# Patient Record
Sex: FEMALE | Race: ASIAN | Employment: UNEMPLOYED | URBAN - METROPOLITAN AREA
[De-identification: names, ages, dates, MRNs, and addresses within clinical notes are randomized per-mention and may not be internally consistent; named-entity substitution may affect disease eponyms.]

---

## 2020-08-17 ENCOUNTER — OFFICE VISIT (OUTPATIENT)
Dept: OPTOMETRY | Facility: CLINIC | Age: 55
End: 2020-08-17
Payer: COMMERCIAL

## 2020-08-17 DIAGNOSIS — H52.13 MYOPIA OF BOTH EYES: ICD-10-CM

## 2020-08-17 DIAGNOSIS — Z46.0 CONTACT LENS/GLASSES FITTING: ICD-10-CM

## 2020-08-17 DIAGNOSIS — H52.223 REGULAR ASTIGMATISM OF BOTH EYES: ICD-10-CM

## 2020-08-17 DIAGNOSIS — Z01.00 ENCOUNTER FOR EXAMINATION OF EYES AND VISION WITHOUT ABNORMAL FINDINGS: Primary | ICD-10-CM

## 2020-08-17 PROCEDURE — 92310 CONTACT LENS FITTING OU: CPT | Mod: GA | Performed by: OPTOMETRIST

## 2020-08-17 PROCEDURE — 92015 DETERMINE REFRACTIVE STATE: CPT | Performed by: OPTOMETRIST

## 2020-08-17 PROCEDURE — 92004 COMPRE OPH EXAM NEW PT 1/>: CPT | Performed by: OPTOMETRIST

## 2020-08-17 ASSESSMENT — VISUAL ACUITY
CORRECTION_TYPE: GLASSES
METHOD: SNELLEN - LINEAR
OS_CC: 20/40-1
OS_CC+: -2
OD_CC: 20/25
OD_CC: 20/50+2
OS_SC: 20/300
OD_CC+: -1
OD_SC: 20/500
OS_CC: 20/30

## 2020-08-17 ASSESSMENT — REFRACTION_WEARINGRX
OD_AXIS: 110
OS_SPHERE: -11.00
OD_SPHERE: -10.25
SPECS_TYPE: SVL
OS_AXIS: 065
OS_CYLINDER: +1.25
OD_CYLINDER: +1.25

## 2020-08-17 ASSESSMENT — TONOMETRY
OS_IOP_MMHG: 18
OD_IOP_MMHG: 18
IOP_METHOD: APPLANATION

## 2020-08-17 ASSESSMENT — REFRACTION_MANIFEST
OS_SPHERE: -10.75
OD_AXIS: 115
OD_CYLINDER: +1.25
OS_CYLINDER: +1.00
OD_SPHERE: -10.25
OS_AXIS: 070

## 2020-08-17 ASSESSMENT — CUP TO DISC RATIO
OD_RATIO: 0.3
OS_RATIO: 0.25

## 2020-08-17 ASSESSMENT — REFRACTION_CURRENTRX
OD_BASECURVE: 8.60
OS_DIAMETER: 14.5
OD_DIAMETER: 14.5
OS_SPHERE: -9.00
OD_BRAND: ACUVUE OASYS FOR ASTIGMATISM
OD_SPHERE: -8.00
OS_CYLINDER: -0.75
OD_AXIS: 020
OS_AXIS: 160
OD_CYLINDER: -0.75
OS_BASECURVE: 8.60
OS_BRAND: ACUVUE OASYS FOR ASTIGMATISM

## 2020-08-17 ASSESSMENT — SLIT LAMP EXAM - LIDS
COMMENTS: NORMAL
COMMENTS: NORMAL

## 2020-08-17 ASSESSMENT — CONF VISUAL FIELD
OD_NORMAL: 1
OS_NORMAL: 1

## 2020-08-17 ASSESSMENT — EXTERNAL EXAM - RIGHT EYE: OD_EXAM: NORMAL

## 2020-08-17 ASSESSMENT — EXTERNAL EXAM - LEFT EYE: OS_EXAM: NORMAL

## 2020-08-17 NOTE — Clinical Note
8/17/2020         RE: Annelise Ruiz  [address] [facility] [address]        Dear Colleague,    Thank you for referring your patient, Annelise Ruiz, to the [facility]. Please see a copy of my visit note below.    Chief Complaint   Patient presents with     Annual Eye Exam     Contact Lens Evaluation        Previous contact lens wearer? Yes: Acuvue Oasys for Astigmatism - wears couple times a week. - keeps for 1 month. Patient uses [employer] contact lens solution.  Comfort of contact lenses :likes contacts -   Satisfied with current lenses: Yes        Last Eye Exam: 2014  Dilated Previously: Yes    What are you currently using to see?  Glasses/contacts. Patient is wearing an older prescription which she prefers to her 2014 prescription (in glasses)    Distance Vision Acuity: Satisfied with vision    Near Vision Acuity: Patient has to wear cheaters over contacts for the really small letters. +1.25 or 1.5.   Patient keeps glasses on to read. Patient reports when wearing her glasses, she is able to see fine up close.     Eye Comfort: good  Do you use eye drops? : No  Occupation or Hobbies: med tech      Bell Jeana  [employer]       Medical, surgical and family histories reviewed and updated 8/17/2020.       OBJECTIVE: See Ophthalmology exam    ASSESSMENT:    ICD-10-CM    1. Encounter for examination of eyes and vision without abnormal findings  Z01.00    2. Myopia of both eyes  H52.13    3. Regular astigmatism of both eyes  H52.223    4. Contact lens/glasses fitting  Z46.0       PLAN:     Patient Instructions   Updated glasses and contact lens prescriptions provided today.   Stable prescriptions.     No sleeping or swimming in the contact lenses.   Maximum wear time of 12 hours/day of the contacts.   Clean the lenses nightly in contact lens solution.   Replace the lenses every 2 weeks.     Return in 1 year for comprehensive eye exam, or sooner if needed.     The effects of the dilating  drops last for 4- 6 hours.  You will be more sensitive to light and vision will be blurry up close.  Mydriatic sunglasses were given if needed.    Atul Francis, AMANULE  [address]. [place]  [address]    [phone]                           Again, thank you for allowing me to participate in the care of your patient.        Sincerely,        Atul Francis, OD

## 2020-08-17 NOTE — PROGRESS NOTES
Chief Complaint   Patient presents with     Annual Eye Exam     Contact Lens Evaluation     Patient paid contact lens fee.     Previous contact lens wearer? Yes: Acuvue Oasys for Astigmatism - wears couple times a week. - keeps for 1 month. Patient uses [employer] contact lens solution.  Comfort of contact lenses :likes contacts -   Satisfied with current lenses: Yes        Last Eye Exam: 2014  Dilated Previously: Yes    What are you currently using to see?  Glasses/contacts. Patient is wearing an older prescription which she prefers to her 2014 prescription (in glasses)    Distance Vision Acuity: Satisfied with vision    Near Vision Acuity: Patient has to wear cheaters over contacts for the really small letters. +1.25 or 1.5.   Patient keeps glasses on to read. Patient reports when wearing her glasses, she is able to see fine up close.     Eye Comfort: good  Do you use eye drops? : No  Occupation or Hobbies: med tech      [employer]  [employer]       Medical, surgical and family histories reviewed and updated 8/17/2020.       OBJECTIVE: See Ophthalmology exam    ASSESSMENT:    ICD-10-CM    1. Encounter for examination of eyes and vision without abnormal findings  Z01.00    2. Myopia of both eyes  H52.13    3. Regular astigmatism of both eyes  H52.223    4. Contact lens/glasses fitting  Z46.0       PLAN:     Patient Instructions   Updated glasses and contact lens prescriptions provided today.   Stable prescriptions.     No sleeping or swimming in the contact lenses.   Maximum wear time of 12 hours/day of the contacts.   Clean the lenses nightly in contact lens solution.   Replace the lenses every 2 weeks.     Return in 1 year for comprehensive eye exam, or sooner if needed.     The effects of the dilating drops last for 4- 6 hours.  You will be more sensitive to light and vision will be blurry up close.  Mydriatic sunglasses were given if needed.    Atul Francis, [facility]  [address]  [facility]. [place]  [address]    [phone]

## 2020-08-17 NOTE — PATIENT INSTRUCTIONS
Updated glasses and contact lens prescriptions provided today.   Stable prescriptions.     No sleeping or swimming in the contact lenses.   Maximum wear time of 12 hours/day of the contacts.   Clean the lenses nightly in contact lens solution.   Replace the lenses every 2 weeks.     Return in 1 year for comprehensive eye exam, or sooner if needed.     The effects of the dilating drops last for 4- 6 hours.  You will be more sensitive to light and vision will be blurry up close.  Mydriatic sunglasses were given if needed.    Atul Francis, OD  [address] [place]  [address] [phone]